# Patient Record
Sex: MALE | Race: WHITE
[De-identification: names, ages, dates, MRNs, and addresses within clinical notes are randomized per-mention and may not be internally consistent; named-entity substitution may affect disease eponyms.]

---

## 2018-02-01 ENCOUNTER — HOSPITAL ENCOUNTER (OUTPATIENT)
Dept: HOSPITAL 52 - LL.SDS | Age: 53
Discharge: HOME | End: 2018-02-01
Attending: SURGERY
Payer: COMMERCIAL

## 2018-02-01 VITALS — DIASTOLIC BLOOD PRESSURE: 68 MMHG | SYSTOLIC BLOOD PRESSURE: 113 MMHG

## 2018-02-01 DIAGNOSIS — D12.0: Primary | ICD-10-CM

## 2018-02-01 DIAGNOSIS — D12.4: ICD-10-CM

## 2018-02-01 DIAGNOSIS — K62.1: ICD-10-CM

## 2018-02-01 DIAGNOSIS — Z87.891: ICD-10-CM

## 2018-02-01 DIAGNOSIS — K64.8: ICD-10-CM

## 2018-02-01 DIAGNOSIS — K57.30: ICD-10-CM

## 2018-02-01 PROCEDURE — 45385 COLONOSCOPY W/LESION REMOVAL: CPT

## 2018-02-01 NOTE — PCM.OPNOTE
- General Post-Op/Procedure Note


Date of Surgery/Procedure: 02/01/18


Operative Procedure(s): Colonoscopy with polypectomy


Findings: 





Multiple small colon polyps, Mild Sigmoid Diverticulosis


Pre Op Diagnosis: history of colon polyps


Post-Op Diagnosis: Colon Polyps.  Diverticulosis


Anesthesia Technique: MAC


Primary Surgeon: Eliud Vazquez


Pathology: 





Colon Polyps


Output, Urine Amount: 0


EBL in mLs: 0


Complications: None


Condition: Good

## 2018-02-01 NOTE — PCM.PN
- General Info


Date of Service: 02/01/18





- Review of Systems


Systems Review Comment:: 


52-year-old male with history of colon polyps and also recent episodes of 

hematochezia here for colonoscopy. He is medically stable to proceed today. His 

history and physical is reviewed and there is no significant changes over the 

last 2 weeks. I have again reviewed the proposed colonoscopy with the patient. 

He understands indications and risks and agrees to proceed.








- Patient Data


Vitals - Most Recent: 


 Last Vital Signs











Temp  98.1 F   02/01/18 11:05


 


Pulse  55 L  02/01/18 11:05


 


Resp  18   02/01/18 11:05


 


BP  125/81   02/01/18 11:05


 


Pulse Ox  99   02/01/18 11:05











Weight - Most Recent: 95.708 kg


Med Orders - Current: 


 Current Medications





Lactated Ringer's (Ringers, Lactated)  1,000 mls @ 125 mls/hr IV ASDIRECTED FATIMAH


   Last Admin: 02/01/18 11:59 Dose:  125 mls/hr


Sodium Chloride (Saline Flush)  10 ml FLUSH ASDIRECTED PRN


   PRN Reason: Keep Vein Open





Discontinued Medications





Fentanyl (Sublimaze) Confirm Administered Dose 100 mcg .ROUTE .STK-MED ONE


   Stop: 02/01/18 12:40


Midazolam HCl (Versed 1 Mg/Ml) Confirm Administered Dose 4 mg .ROUTE .STK-MED 

ONE


   Stop: 02/01/18 12:40


Propofol (Diprivan  20 Ml) Confirm Administered Dose 400 mg .ROUTE .STK-MED ONE


   Stop: 02/01/18 12:40











- Problem List Review


Problem List Initiated/Reviewed/Updated: Yes





- My Orders


Last 24 Hours: 


My Active Orders





02/01/18 10:45


Patient Status [ADT] Routine 


Peripheral IV Care [RC] .AS DIRECTED 


Verify Patient Consent Obtain [RC] ASDIRECTED 


Lactated Ringers [Ringers, Lactated] 1,000 ml IV ASDIRECTED 


Sodium Chloride 0.9% [Saline Flush]   10 ml FLUSH ASDIRECTED PRN 


Peripheral IV Insertion Adult [OM.PC] Routine 














- Assessment


Assessment:: 





History of colon polyps


Hematochezia





- Plan


Plan:: 





Colonoscopy

## 2018-02-01 NOTE — OR
Date of Procedure:  02/01/2018

 

PREOPERATIVE DIAGNOSIS:  History of colon polyps and rectal bleeding.

 

POSTOPERATIVE DIAGNOSES:

1. Colon polyps.

2. Diverticulosis.

3. Hemorrhoids.

 

OPERATION PERFORMED:  Colonoscopy with polypectomy.

 

INDICATIONS FOR SURGERY:  This 52-year-old male has had a small amount of

hematochezia recently.  He also has a history of colon polyps on previous

colonoscopy and comes for this exam.

 

FINDINGS:  Small polyps were noted during the exam.  The patient has a cluster

of two 3 to 4 mm polyps in the rectum, 5 cm from the anal verge.  There is a

sessile 6 mm polyp noted in the descending colon, 45 cm from the anal verge.  In

the cecum, the patient has an 8 mm irregular-shaped sessile polyp.  The patient

has a small amount of sigmoid diverticulosis, which does not appear to be

acutely inflamed and also has some small to moderate sized internal hemorrhoids,

however, these were not bleeding at this time.

 

DESCRIPTION OF PROCEDURE:  The patient was taken to the operating room.  He was

given intravenous sedation and with him in the left lateral decubitus position,

digital rectal exam was performed showing no rectal masses.  The Olympus

colonoscope was inserted into the rectum.  Retroflexed examination of the rectal

canal was performed.  In the rectum, two small polyps were identified and these

were removed with a cautery snare and tissue retrieved.  The scope was then

carefully advanced under direct visualization through the entire length of the

colon until the cecum was reached.  Cecal acquisition was confirmed by noting

the light to transilluminate the abdominal wall in the right lower quadrant and

also identifying the normal cecal anatomy including the appendiceal orifice and

ileocecal valve.  After the cecum had been carefully examined, the scope was

slowly withdrawn sequentially re-examining the colonic segments.  During

insertion and withdrawal of the scope, the polyps in the cecum and descending

colon were identified and they were removed with a cautery snare.  The cecal

polyp was removed and retrieved with a small amount of residual polyp noted on

the adjacent mucosa, but this was cauterized and no indication of any polyp left

behind was identified.  The patient tolerated the procedure well and there was

no evidence of any complication.  After the exam had been completed, the scope

was removed and the patient was then taken from the operating room in

satisfactory condition.

 

ESTIMATED BLOOD LOSS:  Zero.

 

COMPLICATIONS:  None.

 

PROGNOSIS:  Good.

 

WENDY Vazquez MD

DD:  02/01/2018 13:36:10

DT:  02/01/2018 20:02:07

Job #:  [id number]

MTDD